# Patient Record
Sex: FEMALE | Race: WHITE
[De-identification: names, ages, dates, MRNs, and addresses within clinical notes are randomized per-mention and may not be internally consistent; named-entity substitution may affect disease eponyms.]

---

## 2019-01-15 ENCOUNTER — RECORDS - HEALTHEAST (OUTPATIENT)
Dept: ADMINISTRATIVE | Facility: OTHER | Age: 59
End: 2019-01-15

## 2019-02-04 ENCOUNTER — RECORDS - HEALTHEAST (OUTPATIENT)
Dept: ADMINISTRATIVE | Facility: OTHER | Age: 59
End: 2019-02-04

## 2019-02-12 ENCOUNTER — RECORDS - HEALTHEAST (OUTPATIENT)
Dept: ADMINISTRATIVE | Facility: OTHER | Age: 59
End: 2019-02-12

## 2019-09-25 ENCOUNTER — RECORDS - HEALTHEAST (OUTPATIENT)
Dept: ADMINISTRATIVE | Facility: OTHER | Age: 59
End: 2019-09-25

## 2020-01-09 ENCOUNTER — OFFICE VISIT - HEALTHEAST (OUTPATIENT)
Dept: INTERNAL MEDICINE | Facility: CLINIC | Age: 60
End: 2020-01-09

## 2020-01-09 ENCOUNTER — AMBULATORY - HEALTHEAST (OUTPATIENT)
Dept: INTERNAL MEDICINE | Facility: CLINIC | Age: 60
End: 2020-01-09

## 2020-01-09 ENCOUNTER — COMMUNICATION - HEALTHEAST (OUTPATIENT)
Dept: TELEHEALTH | Facility: CLINIC | Age: 60
End: 2020-01-09

## 2020-01-09 DIAGNOSIS — Z00.00 PHYSICAL EXAM, ANNUAL: ICD-10-CM

## 2020-01-09 DIAGNOSIS — K21.00 GASTROESOPHAGEAL REFLUX DISEASE WITH ESOPHAGITIS: ICD-10-CM

## 2020-01-09 DIAGNOSIS — Z86.0100 HISTORY OF COLONIC POLYPS: ICD-10-CM

## 2020-01-09 LAB
ALBUMIN SERPL-MCNC: 4.2 G/DL (ref 3.5–5)
ALP SERPL-CCNC: 68 U/L (ref 45–120)
ALT SERPL W P-5'-P-CCNC: 16 U/L (ref 0–45)
ANION GAP SERPL CALCULATED.3IONS-SCNC: 9 MMOL/L (ref 5–18)
AST SERPL W P-5'-P-CCNC: 26 U/L (ref 0–40)
BILIRUB SERPL-MCNC: 0.7 MG/DL (ref 0–1)
BUN SERPL-MCNC: 11 MG/DL (ref 8–22)
CALCIUM SERPL-MCNC: 9.6 MG/DL (ref 8.5–10.5)
CHLORIDE BLD-SCNC: 105 MMOL/L (ref 98–107)
CHOLEST SERPL-MCNC: 268 MG/DL
CO2 SERPL-SCNC: 26 MMOL/L (ref 22–31)
CREAT SERPL-MCNC: 0.81 MG/DL (ref 0.6–1.1)
ERYTHROCYTE [DISTWIDTH] IN BLOOD BY AUTOMATED COUNT: 11.3 % (ref 11–14.5)
FASTING STATUS PATIENT QL REPORTED: ABNORMAL
GFR SERPL CREATININE-BSD FRML MDRD: >60 ML/MIN/1.73M2
GLUCOSE BLD-MCNC: 99 MG/DL (ref 70–125)
HCT VFR BLD AUTO: 38.5 % (ref 35–47)
HDLC SERPL-MCNC: 91 MG/DL
HGB BLD-MCNC: 13.3 G/DL (ref 12–16)
LDLC SERPL CALC-MCNC: 163 MG/DL
MCH RBC QN AUTO: 32.2 PG (ref 27–34)
MCHC RBC AUTO-ENTMCNC: 34.6 G/DL (ref 32–36)
MCV RBC AUTO: 93 FL (ref 80–100)
PLATELET # BLD AUTO: 268 THOU/UL (ref 140–440)
PMV BLD AUTO: 7.7 FL (ref 7–10)
POTASSIUM BLD-SCNC: 4.3 MMOL/L (ref 3.5–5)
PROT SERPL-MCNC: 7.5 G/DL (ref 6–8)
RBC # BLD AUTO: 4.14 MILL/UL (ref 3.8–5.4)
SODIUM SERPL-SCNC: 140 MMOL/L (ref 136–145)
TRIGL SERPL-MCNC: 69 MG/DL
TSH SERPL DL<=0.005 MIU/L-ACNC: 1.98 UIU/ML (ref 0.3–5)
WBC: 6.2 THOU/UL (ref 4–11)

## 2020-01-09 ASSESSMENT — MIFFLIN-ST. JEOR: SCORE: 1275.55

## 2020-01-10 LAB
HPV SOURCE: NORMAL
HUMAN PAPILLOMA VIRUS 16 DNA: NEGATIVE
HUMAN PAPILLOMA VIRUS 18 DNA: NEGATIVE
HUMAN PAPILLOMA VIRUS FINAL DIAGNOSIS: NORMAL
HUMAN PAPILLOMA VIRUS OTHER HR: NEGATIVE
SPECIMEN DESCRIPTION: NORMAL

## 2020-01-15 LAB
BKR LAB AP ABNORMAL BLEEDING: NO
BKR LAB AP BIRTH CONTROL/HORMONES: NORMAL
BKR LAB AP CERVICAL APPEARANCE: NORMAL
BKR LAB AP GYN ADEQUACY: NORMAL
BKR LAB AP GYN INTERPRETATION: NORMAL
BKR LAB AP HPV REFLEX: NORMAL
BKR LAB AP LMP: NORMAL
BKR LAB AP PATIENT STATUS: NORMAL
BKR LAB AP PREVIOUS ABNORMAL: NO
BKR LAB AP PREVIOUS NORMAL: NORMAL
HIGH RISK?: NO
PATH REPORT.COMMENTS IMP SPEC: NORMAL
RESULT FLAG (HE HISTORICAL CONVERSION): NORMAL

## 2020-03-11 ENCOUNTER — OFFICE VISIT (OUTPATIENT)
Dept: OPHTHALMOLOGY | Facility: CLINIC | Age: 60
End: 2020-03-11
Attending: OPHTHALMOLOGY
Payer: COMMERCIAL

## 2020-03-11 DIAGNOSIS — H52.203 HYPEROPIA OF BOTH EYES WITH ASTIGMATISM: ICD-10-CM

## 2020-03-11 DIAGNOSIS — H04.123 BILATERAL DRY EYES: Primary | ICD-10-CM

## 2020-03-11 DIAGNOSIS — H52.03 HYPEROPIA OF BOTH EYES WITH ASTIGMATISM: ICD-10-CM

## 2020-03-11 DIAGNOSIS — H02.056 TRICHIASIS OF LEFT EYE WITHOUT ENTROPION: ICD-10-CM

## 2020-03-11 PROCEDURE — 68761 CLOSE TEAR DUCT OPENING: CPT | Mod: ZF | Performed by: OPHTHALMOLOGY

## 2020-03-11 PROCEDURE — G0463 HOSPITAL OUTPT CLINIC VISIT: HCPCS | Mod: 25

## 2020-03-11 RX ORDER — CARBOXYMETHYLCELLULOSE SODIUM 5 MG/ML
1 SOLUTION/ DROPS OPHTHALMIC PRN
COMMUNITY
End: 2021-11-06

## 2020-03-11 ASSESSMENT — CONF VISUAL FIELD
OS_NORMAL: 1
OD_NORMAL: 1
METHOD: COUNTING FINGERS

## 2020-03-11 ASSESSMENT — REFRACTION_WEARINGRX
OS_SPHERE: +0.25
OS_ADD: +2.00
OD_ADD: +2.00
OS_AXIS: 080
OS_CYLINDER: +0.25
OD_SPHERE: +0.50
OD_CYLINDER: +0.25
OD_AXIS: 100

## 2020-03-11 ASSESSMENT — TONOMETRY
OD_IOP_MMHG: 11
IOP_METHOD: ICARE
OS_IOP_MMHG: 13

## 2020-03-11 ASSESSMENT — SLIT LAMP EXAM - LIDS: COMMENTS: NORMAL

## 2020-03-11 ASSESSMENT — VISUAL ACUITY
METHOD: SNELLEN - LINEAR
CORRECTION_TYPE: GLASSES
OD_CC: 20/20
OD_SC+: -2
OS_CC: 20/20
OD_SC: 20/20
OS_SC: 20/20

## 2020-03-11 ASSESSMENT — EXTERNAL EXAM - LEFT EYE: OS_EXAM: NORMAL

## 2020-03-11 ASSESSMENT — EXTERNAL EXAM - RIGHT EYE: OD_EXAM: NORMAL

## 2020-03-11 ASSESSMENT — CUP TO DISC RATIO
OD_RATIO: 0.15
OS_RATIO: 0.15

## 2020-03-11 NOTE — PROGRESS NOTES
HPI  Shereen Encinas is a 60 year old female who is here for CEE. She reports dry eyes, gritty sensation, blurry vision as the day progresses. She is currently using ATs intermittently and Zatidor but they do not help her much. She tried Prednisolone drops previously and they did not help.    PMH: none  Ocular Hx: refractive error, dry eyes  FHx: refractive error    Assessment & Plan      (H04.123) Bilateral dry eyes  (primary encounter diagnosis)  Comment: Patient did not see benefit from tears and prednisolone  Plan: Discussed r/b/a of punctal plugs and she would like to proceed. 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 or sooner as needed.    Tessy Rangel MD  Ophthalmology Resident, PGY-2    Teaching statement:  Complete documentation of historical and exam elements from today's encounter can be found in the full encounter summary report (not reduplicated in this progress note). I personally obtained the chief complaint(s) and history of present illness.  I confirmed and edited as necessary the review of systems, past medical/surgical history, family history, social history, and examination findings as documented by others; and I examined the patient myself. I personally reviewed the relevant tests, images, and reports as documented above.     I formulated and edited as necessary the assessment and plan and discussed the findings and management plan with the patient and family.    Trina Stevens MD  Comprehensive  Ophthalmology & Ocular Pathology  Department of Ophthalmology and Visual Neurosciences  [email or web address]  Pager [phone]

## 2020-03-11 NOTE — NURSING NOTE
Chief Complaints and History of Present Illnesses   Patient presents with     New Patient     Chief Complaint(s) and History of Present Illness(es)     New Patient     Laterality: both eyes    Quality: blurred vision    Associated symptoms: foreign body sensation and itching.  Negative for flashes, floaters and photophobia    Treatments tried: artificial tears    Response to treatment: no improvement    Pain scale: 2/10              Comments     New patient presents for a comprehensive eye exam.    The patient notes that her eye have a gritty feeling.  She notes that the symptoms increase during the day.  She states that artificial tears aren't helping.  She has itchy eyes that she remembers since childhood.  Renetta Wall, COA, COA 2:38 PM 03/11/2020

## 2021-05-25 ENCOUNTER — RECORDS - HEALTHEAST (OUTPATIENT)
Dept: ADMINISTRATIVE | Facility: CLINIC | Age: 61
End: 2021-05-25

## 2021-05-26 ENCOUNTER — RECORDS - HEALTHEAST (OUTPATIENT)
Dept: ADMINISTRATIVE | Facility: CLINIC | Age: 61
End: 2021-05-26

## 2021-06-04 VITALS
HEART RATE: 62 BPM | SYSTOLIC BLOOD PRESSURE: 110 MMHG | HEIGHT: 65 IN | BODY MASS INDEX: 25.99 KG/M2 | WEIGHT: 156 LBS | DIASTOLIC BLOOD PRESSURE: 72 MMHG | OXYGEN SATURATION: 97 %

## 2021-06-05 NOTE — PROGRESS NOTES
Office Visit - Physical   Shereen Encinas   59 y.o.  female    Date of visit: 1/9/2020  Physician: Danisha López MD     Assessment and Plan   1. Gastroesophageal reflux disease with esophagitis  H/o digestive issues , especially nausea with eating . Has had multiple scopes . Only had mild GERD . No ulcers or h pylori . Feeling better now   2. History of colonic polyps  Getting 5 year scopes by [facility] . She thinks lastone is 2018     3. Physical exam, annual  Up to date on immunizations   talked about shingles   - Lipid Cascade FASTING  - Thyroid Stimulating Hormone (TSH)  - Comprehensive Metabolic Panel  - HM2(CBC w/o Differential)  - Mammo Screening Bilateral; Future      Talked about her weight concerns , BMI only minimally elevated , has large pendulous breasts but no lumps . Reassured that with her level of exercise and lack of smoking she should not have any concerns about her weight unless it steadily rises .         Time:   Return in about 1 year (around 1/9/2021).   Takes fish oil and vit d ,     Patient Profile   Social History     Social History Narrative    Youngest is 19 , 22 year old graduated and lives     Used to do banking , retired      works     Works out , dogs run one mile every day     [employer] cardio and lift         Past Medical History   No past medical history on file.    Patient Active Problem List    Diagnosis Date Noted     Gastroesophageal reflux disease with esophagitis 01/09/2020     History of colonic polyps 01/09/2020           Past Surgical History  She has no past surgical history on file.     History of Present Illness   This 59 y.o. old every time she ate felt nausea , has had endoscopy diagnosed to have GERD . Had some colon polyps     Review of Systems: A comprehensive review of systems was negative except as noted.     Medications and Allergies   Current Outpatient Medications   Medication Sig Dispense Refill     cetirizine (ZYRTEC) 10 MG tablet Take 10 mg  "by mouth daily.       omeprazole (PRILOSEC) 40 MG capsule Take 40 mg by mouth daily.       No current facility-administered medications for this visit.      No Known Allergies     Family and Social History   Family History   Problem Relation Age of Onset     Pancreatic cancer Mother      Heart attack Father      Diabetes Father      Depression Father      Gallbladder disease Sister         Social History     Tobacco Use     Smoking status: Never Smoker     Smokeless tobacco: Never Used   Substance Use Topics     Alcohol use: Yes     Frequency: 2-3 times a week     Drug use: Not on file          Physical Exam   General Appearance:       /72 (Patient Site: Right Arm, Patient Position: Sitting, Cuff Size: Adult Large)   Pulse 62   Ht 5' 4.5\" (1.638 m)   Wt 156 lb (70.8 kg)   SpO2 97%   BMI 26.36 kg/m      NECK: Neck appearance was normal. There were no neck masses and the thyroid was not enlarged.  RESPIRATORY: Breathing pattern was normal and the chest moved symmetrically.  Percussion/auscultatory percussion was normal.  Lung sounds were normal and there were no abnormal sounds.  CARDIOVASCULAR: Heart rate and rhythm were normal.  S1 and S2 were normal and there were no extra sounds or murmurs. Peripheral pulses in arms and legs were normal.  Jugular venous pressure was normal.  There was no peripheral edema.  GASTROINTESTINAL: The abdomen was normal in contour.  Bowel sounds were present.  Percussion detected no organ enlargement or tenderness.  Palpation detected no tenderness, mass, or enlarged organs.   MUSCULOSKELETAL: Skeletal configuration was normal and muscle mass was normal for age. Joint appearance was overall normal.  LYMPHATIC: There were no enlarged nodes.  SKIN/HAIR/NAILS: Skin color was normal.  There were no skin lesions.  Hair and nails were normal.  NEUROLOGIC: The patient was alert and oriented to person, place, time, and circumstance. Speech was normal. Cranial nerves were normal. Motor " strength was normal for age. The patient was normally coordinated.  PSYCHIATRIC:  Mood and affect were normal and the patient had normal recent and remote memory. The patient's judgment and insight were normal.    BREASTS: symmetrical , NO LUMPS     GENITAL/URINARY: Pelvic: cervix normal in appearance, external genitalia normal, no adnexal masses or tenderness, no cervical motion tenderness, rectovaginal septum normal, uterus normal size, shape, and consistency and vagina normal without discharge       Additional Information        Danisha López MD  Internal Medicine  Contact me at None

## 2021-06-05 NOTE — PATIENT INSTRUCTIONS - HE
Check with your insurance or pharmacy about the coverage of Shingrix vaccine for Shingles if you wish to get this.  They may want you to receive it at the pharmacy instead of in our clinic.  You would need to get 2 shots, the second one is 2-6 months after the first one. This is not a live vaccine and is much more potent than the prior vaccine .    The most common side effect is pain with mild redness and swelling around the shot site that should resolve in 2-3 days.

## 2021-06-16 PROBLEM — Z86.0100 HISTORY OF COLONIC POLYPS: Status: ACTIVE | Noted: 2020-01-09

## 2021-06-16 PROBLEM — K21.00 GASTROESOPHAGEAL REFLUX DISEASE WITH ESOPHAGITIS: Status: ACTIVE | Noted: 2020-01-09

## 2021-07-12 ENCOUNTER — TRANSFERRED RECORDS (OUTPATIENT)
Dept: HEALTH INFORMATION MANAGEMENT | Facility: CLINIC | Age: 61
End: 2021-07-12

## 2021-07-21 ENCOUNTER — TRANSFERRED RECORDS (OUTPATIENT)
Dept: HEALTH INFORMATION MANAGEMENT | Facility: CLINIC | Age: 61
End: 2021-07-21

## 2021-08-15 ENCOUNTER — HEALTH MAINTENANCE LETTER (OUTPATIENT)
Age: 61
End: 2021-08-15

## 2021-10-10 ENCOUNTER — HEALTH MAINTENANCE LETTER (OUTPATIENT)
Age: 61
End: 2021-10-10

## 2021-11-06 ENCOUNTER — OFFICE VISIT (OUTPATIENT)
Dept: FAMILY MEDICINE | Facility: CLINIC | Age: 61
End: 2021-11-06
Payer: COMMERCIAL

## 2021-11-06 VITALS
SYSTOLIC BLOOD PRESSURE: 164 MMHG | OXYGEN SATURATION: 96 % | DIASTOLIC BLOOD PRESSURE: 97 MMHG | TEMPERATURE: 98.5 F | RESPIRATION RATE: 18 BRPM | HEART RATE: 78 BPM

## 2021-11-06 DIAGNOSIS — R05.9 COUGH: ICD-10-CM

## 2021-11-06 DIAGNOSIS — J06.9 VIRAL URI WITH COUGH: Primary | ICD-10-CM

## 2021-11-06 PROCEDURE — 99214 OFFICE O/P EST MOD 30 MIN: CPT | Performed by: NURSE PRACTITIONER

## 2021-11-06 PROCEDURE — U0005 INFEC AGEN DETEC AMPLI PROBE: HCPCS | Performed by: NURSE PRACTITIONER

## 2021-11-06 PROCEDURE — U0003 INFECTIOUS AGENT DETECTION BY NUCLEIC ACID (DNA OR RNA); SEVERE ACUTE RESPIRATORY SYNDROME CORONAVIRUS 2 (SARS-COV-2) (CORONAVIRUS DISEASE [COVID-19]), AMPLIFIED PROBE TECHNIQUE, MAKING USE OF HIGH THROUGHPUT TECHNOLOGIES AS DESCRIBED BY CMS-2020-01-R: HCPCS | Performed by: NURSE PRACTITIONER

## 2021-11-06 RX ORDER — ALBUTEROL SULFATE 90 UG/1
1-2 AEROSOL, METERED RESPIRATORY (INHALATION) EVERY 4 HOURS PRN
Qty: 6.7 G | Refills: 0 | Status: SHIPPED | OUTPATIENT
Start: 2021-11-06 | End: 2023-12-14

## 2021-11-06 RX ORDER — GUAIFENESIN 600 MG/1
1200 TABLET, EXTENDED RELEASE ORAL 2 TIMES DAILY PRN
Qty: 40 TABLET | Refills: 0 | Status: SHIPPED | OUTPATIENT
Start: 2021-11-06 | End: 2023-12-14

## 2021-11-06 ASSESSMENT — ENCOUNTER SYMPTOMS
APPETITE CHANGE: 0
HEADACHES: 0
NAUSEA: 0
CHILLS: 0
DYSURIA: 0
FATIGUE: 1
COUGH: 1
WHEEZING: 0
SHORTNESS OF BREATH: 1
SORE THROAT: 0
DIARRHEA: 1
FEVER: 0
MYALGIAS: 0
VOMITING: 0
RHINORRHEA: 1
CHEST TIGHTNESS: 1
ACTIVITY CHANGE: 0

## 2021-11-07 LAB — SARS-COV-2 RNA RESP QL NAA+PROBE: NEGATIVE

## 2021-11-07 NOTE — PATIENT INSTRUCTIONS

## 2021-11-07 NOTE — PROGRESS NOTES
Patient presents with:  Respiratory Problems: CHEST FEELS PLUGGED, COUGH FOR A FEW DAYS. POSSIBLE BRONCHITIS.   Diarrhea: X2 DAYS       Clinical Decision Making: Focused exam with nasal congestion, erythremic throat, and clear lung sounds throughout. Clinical presentation consistent with viral URI with cough. Discussed symptomatic cares with mucinex and albuterol inhaler for chest congestion and cough. Discontinue phenylephrine and other OTC cold agents. COVID test pending. Results via [employer] discussed. Education provided.       ICD-10-CM    1. Viral URI with cough  J06.9 albuterol (PROAIR HFA/PROVENTIL HFA/VENTOLIN HFA) 108 (90 Base) MCG/ACT inhaler     guaiFENesin (MUCINEX) 600 MG 12 hr tablet   2. Cough  R05.9 Symptomatic COVID-19 Virus (Coronavirus) by PCR Nose       Patient Instructions       Patient Education     Viral Upper Respiratory Illness (Adult)    You have a viral upper respiratory illness (URI), which is another term for the common cold. This illness is contagious during the first few days. It is spread through the air by coughing and sneezing. It may also be spread by direct contact (touching the sick person and then touching your own eyes, nose, or mouth). Frequent handwashing will decrease risk of spread. Most viral illnesses go away within 7 to 10 days with rest and simple home remedies. Sometimes the illness may last for several weeks. Antibiotics will not kill a virus, and they are generally not prescribed for this condition.  Home care    If symptoms are severe, rest at home for the first 2 to 3 days. When you resume activity, don't let yourself get too tired.    Don't smoke. If you need help stopping, talk with your healthcare provider.    Avoid being exposed to cigarette smoke (yours or others ).    You may use acetaminophen or ibuprofen to control pain and fever, unless another medicine was prescribed. If you have chronic liver or kidney disease, have ever had a stomach ulcer or  gastrointestinal bleeding, or are taking blood-thinning medicines, talk with your healthcare provider before using these medicines. Aspirin should never be given to anyone under 18 years of age who is ill with a viral infection or fever. It may cause severe liver or brain damage.    Your appetite may be poor, so a light diet is fine. Stay well hydrated by drinking 6 to 8 glasses of fluids per day (water, soft drinks, juices, tea, or soup). Extra fluids will help loosen secretions in the nose and lungs.    Over-the-counter cold medicines will not shorten the length of time you re sick, but they may be helpful for the following symptoms: cough, sore throat, and nasal and sinus congestion. If you take prescription medicines, ask your healthcare provider or pharmacist which over-the-counter medicines are safe to use. (Note: Don't use decongestants if you have high blood pressure.)  Follow-up care  Follow up with your healthcare provider, or as advised.  When to seek medical advice  Call your healthcare provider right away if any of these occur:    Cough with lots of colored sputum (mucus)    Severe headache; face, neck, or ear pain    Difficulty swallowing due to throat pain    Fever of 100.4 F (38 C) or higher, or as directed by your healthcare provider  Call 911  Call 911 if any of these occur:    Chest pain, shortness of breath, wheezing, or difficulty breathing    Coughing up blood    Very severe pain with swallowing, especially if it goes along with a muffled voice   [employer] last reviewed this educational content on 6/1/2018 2000-2021 [employer]. All rights reserved. This information is not intended as a substitute for professional medical care. Always follow your healthcare professional's instructions.               HPI: Shereen Encinas is a 61 year old female who presents today complaining of cough with chest congestion, nasal congestion, and diarrhea for 2 days. She has taken OTC cold agents  "with decongestants and noticed \"an increased heart rate/palpatations\" overnight. Reports return from airport today following recent travel to [place]. Patient is fully COVID vaccinated. No known ill contacts or COVID exposures.     History obtained from the patient.    Problem List:  2020-01: Gastroesophageal reflux disease with esophagitis  2020-01: History of colonic polyps      No past medical history on file.    Social History     Tobacco Use     Smoking status: Never Smoker     Smokeless tobacco: Never Used   Substance Use Topics     Alcohol use: Not on file       Review of Systems   Constitutional: Positive for fatigue. Negative for activity change, appetite change, chills and fever.   HENT: Positive for congestion and rhinorrhea. Negative for ear pain and sore throat.    Respiratory: Positive for cough, chest tightness and shortness of breath. Negative for wheezing.    Gastrointestinal: Positive for diarrhea. Negative for nausea and vomiting.   Genitourinary: Negative for dysuria.   Musculoskeletal: Negative for myalgias.   Neurological: Negative for headaches.       Vitals:    11/06/21 1829 11/06/21 2005   BP: (!) 162/92 (!) 164/97   BP Location: Right arm    Patient Position: Sitting    Cuff Size: Adult Regular    Pulse: 78    Resp: 18    Temp: 98.5  F (36.9  C)    TempSrc: Oral    SpO2: 96%        Physical Exam  Constitutional:       General: She is not in acute distress.     Appearance: She is ill-appearing. She is not toxic-appearing.   HENT:      Head: Normocephalic and atraumatic.      Right Ear: Tympanic membrane, ear canal and external ear normal.      Left Ear: Tympanic membrane, ear canal and external ear normal.      Nose: Congestion and rhinorrhea present.      Mouth/Throat:      Mouth: Mucous membranes are moist.      Pharynx: Posterior oropharyngeal erythema present. No oropharyngeal exudate.   Cardiovascular:      Rate and Rhythm: Normal rate and regular rhythm.      Pulses: Normal pulses. "      Heart sounds: Normal heart sounds.   Pulmonary:      Effort: Pulmonary effort is normal.      Breath sounds: Normal breath sounds. No wheezing or rhonchi.   Musculoskeletal:      Cervical back: Neck supple.   Lymphadenopathy:      Cervical: No cervical adenopathy.   Skin:     General: Skin is warm.      Capillary Refill: Capillary refill takes less than 2 seconds.      Coloration: Skin is not pale.      Findings: No rash.   Neurological:      Mental Status: She is alert and oriented to person, place, and time.   Psychiatric:         Behavior: Behavior normal.         Labs:  No results found for any visits on 11/06/21.      At the end of the encounter, I discussed results, diagnosis, medications. Discussed red flags for immediate return to clinic/ER, as well as indications for follow up if no improvement. Patient understood and agreed to plan.     SUKHDEV Schuster CNP

## 2021-12-15 ENCOUNTER — OFFICE VISIT (OUTPATIENT)
Dept: OPHTHALMOLOGY | Facility: CLINIC | Age: 61
End: 2021-12-15
Attending: OPHTHALMOLOGY
Payer: COMMERCIAL

## 2021-12-15 DIAGNOSIS — H04.123 DRY EYES, BILATERAL: Primary | ICD-10-CM

## 2021-12-15 DIAGNOSIS — H52.4 PRESBYOPIA: ICD-10-CM

## 2021-12-15 DIAGNOSIS — H52.203 HYPEROPIC ASTIGMATISM OF BOTH EYES: ICD-10-CM

## 2021-12-15 PROCEDURE — 68761 CLOSE TEAR DUCT OPENING: CPT | Performed by: OPHTHALMOLOGY

## 2021-12-15 PROCEDURE — 92014 COMPRE OPH EXAM EST PT 1/>: CPT | Mod: 25 | Performed by: OPHTHALMOLOGY

## 2021-12-15 PROCEDURE — 92015 DETERMINE REFRACTIVE STATE: CPT

## 2021-12-15 PROCEDURE — G0463 HOSPITAL OUTPT CLINIC VISIT: HCPCS

## 2021-12-15 ASSESSMENT — EXTERNAL EXAM - RIGHT EYE: OD_EXAM: NORMAL

## 2021-12-15 ASSESSMENT — VISUAL ACUITY
OS_CC: 20/20
METHOD: SNELLEN - LINEAR
CORRECTION_TYPE: GLASSES
OD_CC: 20/20

## 2021-12-15 ASSESSMENT — REFRACTION_MANIFEST
OD_SPHERE: +0.75
OS_ADD: +2.50
OS_SPHERE: +0.25
OS_CYLINDER: +0.25
OS_AXIS: 023
OD_CYLINDER: SPHERE
OD_ADD: +2.50

## 2021-12-15 ASSESSMENT — REFRACTION_WEARINGRX
OS_ADD: +2.00
OS_CYLINDER: +0.25
OS_SPHERE: +0.25
OD_SPHERE: +0.50
OD_AXIS: 100
OS_AXIS: 080
OD_CYLINDER: +0.25
OD_ADD: +2.00

## 2021-12-15 ASSESSMENT — CONF VISUAL FIELD
METHOD: COUNTING FINGERS
OS_NORMAL: 1
OD_NORMAL: 1

## 2021-12-15 ASSESSMENT — CUP TO DISC RATIO
OD_RATIO: 0.15
OS_RATIO: 0.15

## 2021-12-15 ASSESSMENT — TONOMETRY
IOP_METHOD: TONOPEN
OS_IOP_MMHG: 15
OD_IOP_MMHG: 15

## 2021-12-15 ASSESSMENT — EXTERNAL EXAM - LEFT EYE: OS_EXAM: NORMAL

## 2021-12-15 NOTE — NURSING NOTE
Chief Complaints and History of Present Illnesses   Patient presents with     Annual Eye Exam     Chief Complaint(s) and History of Present Illness(es)     Annual Eye Exam     Laterality: both eyes    Onset: 1 year ago              Comments     Pt. States that she is doing well. VA seems stable but may  have worsened slightly. Plug fell out of RE and seems like it is falling out LE. Dryness has improved with plugs but has been worsening since plug fell out.   Trish Wil COT 1:53 PM December 15, 2021

## 2021-12-15 NOTE — PROGRESS NOTES
HPI     Annual Eye Exam     In both eyes.  This started 1 year ago.              Comments     Pt. States that she is doing well. VA seems stable but may  have worsened slightly. Plug fell out of RE and seems like it is falling out LE. Dryness has improved with plugs but has been worsening since plug fell out.   Trish De La Torre COT 1:53 PM December 15, 2021             Last edited by Trish De La Torre on 12/15/2021  1:53 PM. (History)          HPI  Shereen Encinas is a 61 year old female who is here for follow-up of dry eyes and full eye exam. She thinks the plug fell out of her right eye, and may be falling out of her left eye. The dryness is improved when the plugs radha in place. Her vision is overall stable to slightly more blurred.     PMH: Dry eye  Ocular Hx: refractive error, dry eyes  FHx: refractive error    Assessment & Plan      (H04.123) Bilateral dry eyes  (primary encounter diagnosis)  Comment: Patient did not see benefit from tears and prednisolone. She did notice improvement with plugs, but RLL plug fell out.   Plan: RLL plug replaced (1.0 mm). LLL plug (1.0) remains in place. Discussed that if plug repeatedly falls out, may consider cautery.  Continue PF Refresh, can try celluvisc  If additional therapy needed in the future, can add Restasis/Xiidra    (H52.03,  H52.203) Hyperopia of both eyes with astigmatism  Comment: Good vision with current glasses, minimal change in refraction  Plan: Given updated glasses Rx.   -----------------------------------------------------------------------------------    Patient disposition:   Return in about 1 year (around 12/15/2022) for dilated eye exam, or sooner as needed.     Teaching statement:  Complete documentation of historical and exam elements from today's encounter can be found in the full encounter summary report (not reduplicated in this progress note). I personally obtained the chief complaint(s) and history of present illness.  I confirmed and edited as  necessary the review of systems, past medical/surgical history, family history, social history, and examination findings as documented by others; and I examined the patient myself. I personally reviewed the relevant tests, images, and reports as documented above.     I formulated and edited as necessary the assessment and plan and discussed the findings and management plan with the patient and family.    Trina Stevens MD  Comprehensive Ophthalmology & Ocular Pathology  Department of Ophthalmology and Visual Neurosciences  [email or web address]  Pager [phone]

## 2022-05-23 ENCOUNTER — TRANSFERRED RECORDS (OUTPATIENT)
Dept: HEALTH INFORMATION MANAGEMENT | Facility: CLINIC | Age: 62
End: 2022-05-23
Payer: COMMERCIAL

## 2022-08-03 ENCOUNTER — TRANSFERRED RECORDS (OUTPATIENT)
Dept: HEALTH INFORMATION MANAGEMENT | Facility: CLINIC | Age: 62
End: 2022-08-03

## 2022-09-18 ENCOUNTER — HEALTH MAINTENANCE LETTER (OUTPATIENT)
Age: 62
End: 2022-09-18

## 2022-09-27 ENCOUNTER — TRANSFERRED RECORDS (OUTPATIENT)
Dept: HEALTH INFORMATION MANAGEMENT | Facility: CLINIC | Age: 62
End: 2022-09-27

## 2023-05-18 ENCOUNTER — TRANSFERRED RECORDS (OUTPATIENT)
Dept: HEALTH INFORMATION MANAGEMENT | Facility: CLINIC | Age: 63
End: 2023-05-18
Payer: COMMERCIAL

## 2023-06-09 ENCOUNTER — TRANSFERRED RECORDS (OUTPATIENT)
Dept: HEALTH INFORMATION MANAGEMENT | Facility: CLINIC | Age: 63
End: 2023-06-09
Payer: COMMERCIAL

## 2023-07-06 ENCOUNTER — TRANSFERRED RECORDS (OUTPATIENT)
Dept: HEALTH INFORMATION MANAGEMENT | Facility: CLINIC | Age: 63
End: 2023-07-06
Payer: COMMERCIAL

## 2023-08-17 ENCOUNTER — TRANSFERRED RECORDS (OUTPATIENT)
Dept: HEALTH INFORMATION MANAGEMENT | Facility: CLINIC | Age: 63
End: 2023-08-17
Payer: COMMERCIAL

## 2023-09-25 ENCOUNTER — TRANSFERRED RECORDS (OUTPATIENT)
Dept: HEALTH INFORMATION MANAGEMENT | Facility: CLINIC | Age: 63
End: 2023-09-25
Payer: COMMERCIAL

## 2023-10-04 ENCOUNTER — TRANSFERRED RECORDS (OUTPATIENT)
Dept: HEALTH INFORMATION MANAGEMENT | Facility: CLINIC | Age: 63
End: 2023-10-04
Payer: COMMERCIAL

## 2023-10-08 ENCOUNTER — HEALTH MAINTENANCE LETTER (OUTPATIENT)
Age: 63
End: 2023-10-08

## 2023-10-16 ENCOUNTER — TRANSFERRED RECORDS (OUTPATIENT)
Dept: HEALTH INFORMATION MANAGEMENT | Facility: CLINIC | Age: 63
End: 2023-10-16
Payer: COMMERCIAL

## 2023-10-18 ENCOUNTER — TELEPHONE (OUTPATIENT)
Dept: INTERNAL MEDICINE | Facility: CLINIC | Age: 63
End: 2023-10-18
Payer: COMMERCIAL

## 2023-10-18 NOTE — TELEPHONE ENCOUNTER
October 18, 2023    Outside records received from [facility].  It looks like the patient hasn't been seen since 2020, but records were placed in the inbox for Dr. López to review.  A copy was sent to HIM to be scanned into the patient's chart.    Kerry Fabian

## 2023-10-19 DIAGNOSIS — J32.9 SINUSITIS, UNSPECIFIED CHRONICITY, UNSPECIFIED LOCATION: Primary | ICD-10-CM

## 2023-10-19 RX ORDER — AZITHROMYCIN 250 MG/1
TABLET, FILM COATED ORAL
Qty: 6 TABLET | Refills: 0 | Status: SHIPPED | OUTPATIENT
Start: 2023-10-19 | End: 2023-10-24

## 2023-12-11 ENCOUNTER — TRANSFERRED RECORDS (OUTPATIENT)
Dept: HEALTH INFORMATION MANAGEMENT | Facility: CLINIC | Age: 63
End: 2023-12-11
Payer: COMMERCIAL

## 2023-12-13 ENCOUNTER — TRANSFERRED RECORDS (OUTPATIENT)
Dept: HEALTH INFORMATION MANAGEMENT | Facility: CLINIC | Age: 63
End: 2023-12-13
Payer: COMMERCIAL

## 2023-12-13 NOTE — PROGRESS NOTES
[facility]  [address] [place] [address], [address]  [facility] [address]  Phone: [phone]  Primary Provider: Kyle Ahumada  Pre-op Performing Provider: HARMEET DENT      PREOPERATIVE EVALUATION:  Today's date: 12/14/2023    Shereen is a 63 year old, presenting for the following:  Pre-Op Exam    Surgical Information:  Surgery/Procedure: Thumb proximal phalanx open reduction internal fixation  Surgery Location: [facility]  Surgeon: Hiren Das  Surgery Date: 12/18/2023  Time of Surgery: 1:00 PM  Where patient plans to recover: At home with family  Fax number for surgical facility: [phone]    Assessment & Plan     The proposed surgical procedure is considered LOW risk.    Preoperative examination  Medically optimized for the procedure.    Gastroesophageal reflux disease with esophagitis without hemorrhage  Stable. Continue medication.    Closed avulsion fracture of right thumb with malunion, subsequent encounter    IgA deficiency (H)  Stable.     - No identified additional risk factors other than previously addressed    Antiplatelet or Anticoagulation Medication Instructions:   - Patient is on no antiplatelet or anticoagulation medications.    Additional Medication Instructions:  Hold colestipol the night before the procedure.   Avoid aspirin 7 days before the surgery. Avoid nonsteroidal anti-inflammatory pain medication like ibuprofen, Motrin, or Aleve 7 days before the surgery.  Tylenol can be used for pain.  Avoid any over the counter multivitamins or herbal supplement 7 days before surgery   You can resume these medications after surgery      RECOMMENDATION:  APPROVAL GIVEN to proceed with proposed procedure, without further diagnostic evaluation.      Subjective       HPI related to upcoming procedure:   Patient denies chest pain, shortness of breath, palpitations, headache, lightheadedness, syncope, fever or chills. Patient is able to climb 1  "flight of stairs without feeling short of breath or having chest pain.  Patient denies personal or family history of complications with anesthesia. Patient does not have a history of heart failure or TIA/stroke. Patient does not smoke and drinks alcohol occasionally.      Health Care Directive:  Patient does not have a Health Care Directive or Living Will:     Preoperative Review of :   reviewed - no record of controlled substances prescribed.      Review of Systems   Constitutional:  Negative for chills, fatigue and fever.   Respiratory:  Negative for chest tightness and shortness of breath.    Cardiovascular:  Negative for chest pain and palpitations.   Neurological:  Negative for dizziness, syncope and light-headedness.         Patient Active Problem List    Diagnosis Date Noted    Gastroesophageal reflux disease with esophagitis 01/09/2020     Priority: Medium    History of colonic polyps 01/09/2020     Priority: Medium      No past medical history on file.  No past surgical history on file.  Current Outpatient Medications   Medication Sig Dispense Refill    colestipol (COLESTID) 1 g tablet Take 1 g by mouth daily      omeprazole (PRILOSEC) 20 MG DR capsule Take 20 mg by mouth daily         No Known Allergies     Social History     Tobacco Use    Smoking status: Never    Smokeless tobacco: Never   Substance Use Topics    Alcohol use: Not on file     History   Drug Use Not on file         Objective     /82 (BP Location: Left arm, Patient Position: Sitting, Cuff Size: Adult Regular)   Pulse 74   Temp 97.7  F (36.5  C) (Oral)   Resp 15   Ht 1.638 m (5' 4.5\")   Wt 69 kg (152 lb 1.6 oz)   SpO2 98%   BMI 25.70 kg/m      Physical Exam  Vitals reviewed.   Constitutional:       Appearance: Normal appearance.   HENT:      Mouth/Throat:      Mouth: Mucous membranes are moist.      Pharynx: Oropharynx is clear. No oropharyngeal exudate or posterior oropharyngeal erythema.   Cardiovascular:      Rate and " "Rhythm: Normal rate and regular rhythm.      Heart sounds: Normal heart sounds. No murmur heard.     No gallop.   Pulmonary:      Effort: Pulmonary effort is normal. No respiratory distress.      Breath sounds: Normal breath sounds. No stridor. No wheezing, rhonchi or rales.   Abdominal:      General: Abdomen is flat. There is no distension.      Palpations: Abdomen is soft. There is no mass.      Tenderness: There is no abdominal tenderness. There is no guarding.      Hernia: No hernia is present.   Musculoskeletal:         General: Normal range of motion.      Right lower leg: No edema.      Left lower leg: No edema.   Skin:     General: Skin is warm and dry.   Neurological:      Mental Status: She is alert.         No results for input(s): \"HGB\", \"PLT\", \"INR\", \"NA\", \"POTASSIUM\", \"CR\", \"A1C\" in the last 75876 hours.     Diagnostics:  No labs were ordered during this visit.   No EKG required, no history of coronary heart disease, significant arrhythmia, peripheral arterial disease or other structural heart disease.    Revised Cardiac Risk Index (RCRI):  The patient has the following serious cardiovascular risks for perioperative complications:   - No serious cardiac risks = 0 points     RCRI Interpretation: 0 points: Class I (very low risk - 0.4% complication rate)         Signed Electronically by: Bhakti Bryant MD  Copy of this evaluation report is provided to requesting physician.      "

## 2023-12-14 ENCOUNTER — OFFICE VISIT (OUTPATIENT)
Dept: FAMILY MEDICINE | Facility: CLINIC | Age: 63
End: 2023-12-14
Payer: COMMERCIAL

## 2023-12-14 VITALS
SYSTOLIC BLOOD PRESSURE: 122 MMHG | BODY MASS INDEX: 25.34 KG/M2 | HEIGHT: 65 IN | HEART RATE: 74 BPM | TEMPERATURE: 97.7 F | OXYGEN SATURATION: 98 % | WEIGHT: 152.1 LBS | DIASTOLIC BLOOD PRESSURE: 82 MMHG | RESPIRATION RATE: 15 BRPM

## 2023-12-14 DIAGNOSIS — K21.00 GASTROESOPHAGEAL REFLUX DISEASE WITH ESOPHAGITIS WITHOUT HEMORRHAGE: ICD-10-CM

## 2023-12-14 DIAGNOSIS — D80.2 IGA DEFICIENCY (H): ICD-10-CM

## 2023-12-14 DIAGNOSIS — S62.501P: ICD-10-CM

## 2023-12-14 DIAGNOSIS — Z01.818 PREOPERATIVE EXAMINATION: Primary | ICD-10-CM

## 2023-12-14 PROCEDURE — 99214 OFFICE O/P EST MOD 30 MIN: CPT | Performed by: INTERNAL MEDICINE

## 2023-12-14 RX ORDER — MONTELUKAST SODIUM 4 MG/1
1 TABLET, CHEWABLE ORAL DAILY
COMMUNITY

## 2023-12-14 ASSESSMENT — ENCOUNTER SYMPTOMS
LIGHT-HEADEDNESS: 0
CHILLS: 0
SHORTNESS OF BREATH: 0
FATIGUE: 0
DIZZINESS: 0
CHEST TIGHTNESS: 0
FEVER: 0
PALPITATIONS: 0

## 2023-12-14 ASSESSMENT — PAIN SCALES - GENERAL: PAINLEVEL: MODERATE PAIN (5)

## 2023-12-14 NOTE — PATIENT INSTRUCTIONS
Avoid aspirin 7 days before the surgery. Avoid nonsteroidal anti-inflammatory pain medication like ibuprofen, Motrin, or Aleve 7 days before the surgery.  Tylenol can be used for pain.  Avoid any over the counter multivitamins or herbal supplement 7 days before surgery   You can resume these medications after surgery    Hold colestipol the night before the procedure.

## 2023-12-17 ENCOUNTER — HEALTH MAINTENANCE LETTER (OUTPATIENT)
Age: 63
End: 2023-12-17

## 2023-12-18 ENCOUNTER — TRANSFERRED RECORDS (OUTPATIENT)
Dept: HEALTH INFORMATION MANAGEMENT | Facility: CLINIC | Age: 63
End: 2023-12-18
Payer: COMMERCIAL

## 2023-12-27 ENCOUNTER — TRANSFERRED RECORDS (OUTPATIENT)
Dept: HEALTH INFORMATION MANAGEMENT | Facility: CLINIC | Age: 63
End: 2023-12-27
Payer: COMMERCIAL

## 2024-01-05 ENCOUNTER — TRANSFERRED RECORDS (OUTPATIENT)
Dept: HEALTH INFORMATION MANAGEMENT | Facility: CLINIC | Age: 64
End: 2024-01-05
Payer: COMMERCIAL

## 2024-01-24 ENCOUNTER — TRANSFERRED RECORDS (OUTPATIENT)
Dept: HEALTH INFORMATION MANAGEMENT | Facility: CLINIC | Age: 64
End: 2024-01-24
Payer: COMMERCIAL

## 2024-02-22 ENCOUNTER — TRANSFERRED RECORDS (OUTPATIENT)
Dept: HEALTH INFORMATION MANAGEMENT | Facility: CLINIC | Age: 64
End: 2024-02-22
Payer: COMMERCIAL

## 2024-03-28 ENCOUNTER — TRANSFERRED RECORDS (OUTPATIENT)
Dept: HEALTH INFORMATION MANAGEMENT | Facility: CLINIC | Age: 64
End: 2024-03-28
Payer: COMMERCIAL

## 2024-04-01 PROBLEM — S62.501P: Status: ACTIVE | Noted: 2023-12-14

## 2024-10-22 ENCOUNTER — TRANSFERRED RECORDS (OUTPATIENT)
Dept: HEALTH INFORMATION MANAGEMENT | Facility: CLINIC | Age: 64
End: 2024-10-22
Payer: COMMERCIAL

## 2025-01-12 ENCOUNTER — HEALTH MAINTENANCE LETTER (OUTPATIENT)
Age: 65
End: 2025-01-12

## 2025-02-28 ENCOUNTER — ANCILLARY PROCEDURE (OUTPATIENT)
Dept: GENERAL RADIOLOGY | Facility: CLINIC | Age: 65
End: 2025-02-28
Payer: COMMERCIAL

## 2025-02-28 DIAGNOSIS — J06.9 URI WITH COUGH AND CONGESTION: ICD-10-CM

## 2025-02-28 DIAGNOSIS — R05.2 SUBACUTE COUGH: ICD-10-CM

## 2025-02-28 PROCEDURE — 71046 X-RAY EXAM CHEST 2 VIEWS: CPT | Mod: TC | Performed by: RADIOLOGY

## 2025-03-04 ENCOUNTER — TELEPHONE (OUTPATIENT)
Dept: FAMILY MEDICINE | Facility: CLINIC | Age: 65
End: 2025-03-04

## 2025-03-04 ENCOUNTER — OFFICE VISIT (OUTPATIENT)
Dept: FAMILY MEDICINE | Facility: CLINIC | Age: 65
End: 2025-03-04
Payer: COMMERCIAL

## 2025-03-04 VITALS
WEIGHT: 151.9 LBS | SYSTOLIC BLOOD PRESSURE: 144 MMHG | TEMPERATURE: 97.9 F | OXYGEN SATURATION: 95 % | BODY MASS INDEX: 25.67 KG/M2 | DIASTOLIC BLOOD PRESSURE: 70 MMHG | HEART RATE: 73 BPM | RESPIRATION RATE: 16 BRPM

## 2025-03-04 DIAGNOSIS — R09.89 CHEST CONGESTION: ICD-10-CM

## 2025-03-04 DIAGNOSIS — R09.89 RHONCHI AT BOTH LUNG BASES: Primary | ICD-10-CM

## 2025-03-04 PROCEDURE — 3078F DIAST BP <80 MM HG: CPT

## 2025-03-04 PROCEDURE — 3077F SYST BP >= 140 MM HG: CPT

## 2025-03-04 PROCEDURE — 99214 OFFICE O/P EST MOD 30 MIN: CPT

## 2025-03-04 RX ORDER — ACETAMINOPHEN AND CODEINE PHOSPHATE 300; 30 MG/1; MG/1
TABLET ORAL PRN
COMMUNITY
End: 2025-03-04

## 2025-03-04 RX ORDER — FLUTICASONE FUROATE 50 UG/1
1 POWDER RESPIRATORY (INHALATION) DAILY
Qty: 30 EACH | Refills: 0 | Status: SHIPPED | OUTPATIENT
Start: 2025-03-04 | End: 2025-04-03

## 2025-03-04 ASSESSMENT — ENCOUNTER SYMPTOMS: COUGH: 1

## 2025-03-04 NOTE — PROGRESS NOTES
"  Assessment & Plan     Rhonchi at both lung bases  Chest congestion    Ongoing 4-week history of chest congestion  Started with a cough.  He was started on a Z-Amaury which helped some but not complete resolution  Has felt rundown  Was seen last week and chest x-ray was negative  Last night she was having struggle to breathe and get it deep breath  She felt like her throat was closing in  Now complains of some sore throat  Has been able to eat minimally but able to tolerate liquids  On physical exam the patient had rhonchi of both lung bases  I suspect that the patient started with a viral upper respiratory infection that has not progressed to a bacterial although she does not have a fever  With the sensation of the throat closing as well as tightness in the chest I am going to start the patient on ICS inhaler to be used once a day.  Discussed swish and spit to prevent oral thrush  We also do not start the patient on Augmentin for the sore throat versus the chest congestion  If she has the same sensation of tightness and difficulty to breathe she should report to the emergency department      - fluticasone (ARNUITY ELLIPTA) 50 MCG/ACT inhaler; Inhale 1 puff into the lungs daily.  - amoxicillin-clavulanate (AUGMENTIN) 875-125 MG tablet; Take 1 tablet by mouth 2 times daily for 10 days.          BMI  Estimated body mass index is 25.67 kg/m  as calculated from the following:    Height as of 2/28/25: 1.638 m (5' 4.5\").    Weight as of this encounter: 68.9 kg (151 lb 14.4 oz).       Rafat Regan is a 65 year old, presenting for the following health issues:  Cough (Had a cold beginning of February, was given a Zpak but did not help. Terrible mucous and cough stayed, has been getting worse. Swelling on both sides of throat, gets to the point where pt can't swallow or take a deep breath. )      3/4/2025     7:53 AM   Additional Questions   Roomed by Margarette JAIN     History of Present Illness       Reason for visit:  " Cough, chest tightness & tons of mucus  Symptom onset:  3-4 weeks ago  Symptoms include:  Cough & earache &mucus in throat , runny nose  Symptom intensity:  Severe  Symptom progression:  Worsening  Had these symptoms before:  Yes  Has tried/received treatment for these symptoms:  Yes  Previous treatment was successful:  Yes  Prior treatment description:  [facility]  What makes it better:  Vicks on throat every night   She is taking medications regularly.        February 8th went to [place] to go [employer] on Monday and by the end of the week was coughing so bad she couldn't keep up with Maana. Was prescribed a z pack. This helped some but did not go away completely  Last week she came back and was still having problems with productive cough  Still feeling raspy and tight  Has gotten worse and worse over the weekend  Yesterday thought she was in the clear however Ears were ringing and throat was tight. Went to bed and at midnight felt like she couldn't breath  Used hot tea and that loosened some things up. Feels like can't swallow  Felt like her throat was closing in  No new medicines or foods  Has not been able to swallow whole foods    Feels like has a hard time breath - sometimes has to take a deep breath to feel enough are  Last week was getting production of yellowish chucks  Coughing to get things up  Tessalon perles did not help  Unsure of fever at all  No vomitting  Always has loose stools          Objective    BP (!) 144/70   Pulse 73   Temp 97.9  F (36.6  C) (Oral)   Resp 16   Wt 68.9 kg (151 lb 14.4 oz)   SpO2 95%   BMI 25.67 kg/m    Body mass index is 25.67 kg/m .    Physical Exam  Vitals reviewed.   Constitutional:       Appearance: Normal appearance.   HENT:      Head: Normocephalic and atraumatic.      Right Ear: Tympanic membrane, ear canal and external ear normal.      Left Ear: Tympanic membrane, ear canal and external ear normal.      Nose: Nose normal.      Mouth/Throat:      Mouth:  Mucous membranes are moist.   Eyes:      Extraocular Movements: Extraocular movements intact.      Pupils: Pupils are equal, round, and reactive to light.   Cardiovascular:      Rate and Rhythm: Normal rate and regular rhythm.      Heart sounds: Normal heart sounds.   Pulmonary:      Effort: Pulmonary effort is normal.      Breath sounds: Rhonchi present.   Abdominal:      General: Abdomen is flat. Bowel sounds are normal.      Palpations: Abdomen is soft.   Musculoskeletal:      Cervical back: Normal range of motion and neck supple.      Comments: Appropriate strength in tested fields   Skin:     General: Skin is warm and dry.   Neurological:      General: No focal deficit present.      Mental Status: She is alert.   Psychiatric:         Mood and Affect: Mood normal.         Behavior: Behavior normal.              Signed Electronically by: Jose Mendoza DO

## 2025-03-04 NOTE — TELEPHONE ENCOUNTER
"  Medication Question     Contacts       Contact Date/Time Type Contact Phone/Fax    03/04/2025 12:16 PM CST Phone (Incoming) Shereen Encinas (Self) [phone] (H)        Patient calling to report she called about 20 pharmacies for the following new medication; response is \"out of stock for 50 MCG/ACT.\"    Patient found only 100 MCG at [facility] on [address] in [place].  \"Will this work?\"  \"I need something.\"    Patient asking if provider can order the 100 MCG and send a prescription for the 100 MCG to [facility] in [place] as below    Patient is leaving [facility] in the morning. Need to pick it up today.    Please Advise and call patient when sent to the pharmacy.    What medication are you calling about (include dose and sig)?:     fluticasone (ARNUITY ELLIPTA) 50 MCG/ACT inhaler 30 each 0 3/4/2025 4/3/2025 No  Sig - Route: Inhale 1 puff into the lungs daily. - Inhalation  Sent to pharmacy as: Arnuity Ellipta 50 MCG/ACT Inhalation Aerosol Powder Breath Activated (fluticasone)      Preferred Pharmacy:       [id number] [address] AT [place] [address] [address] [place] [address]  Phone: [phone] Fax: [phone]      Controlled Substance Agreement on file:   CSA -- Patient Level:    CSA: None found at the patient level.       Who prescribed the medication?: Jose Mendoza        Could we send this information to you in [employer] or would you prefer to receive a phone call?:   Patient would prefer a phone call   Okay to leave a detailed message?: Yes at Cell number on file:    Telephone Information:   Mobile [phone]     "

## 2025-03-04 NOTE — PATIENT INSTRUCTIONS
Thank you for seeing us today at [facility].    Very sorry to see Shereen MEJÍA Ce not feeling well    An inhaler and antibiotic were sent to your pharmacy.  Please remember to swish and spit following use of inhaler  I do not foresee complications of going on your upcoming trip however use your best judgment  If worsening chest pain, tightness, shortness of breath please report to the emergency department    Sincerely,  Jose Mendoza DO, MS  [facility]  [phone]

## 2025-05-12 ENCOUNTER — ANCILLARY PROCEDURE (OUTPATIENT)
Dept: GENERAL RADIOLOGY | Facility: CLINIC | Age: 65
End: 2025-05-12
Attending: PHYSICIAN ASSISTANT
Payer: COMMERCIAL

## 2025-05-12 ENCOUNTER — OFFICE VISIT (OUTPATIENT)
Dept: INTERNAL MEDICINE | Facility: CLINIC | Age: 65
End: 2025-05-12
Payer: COMMERCIAL

## 2025-05-12 VITALS
SYSTOLIC BLOOD PRESSURE: 150 MMHG | BODY MASS INDEX: 25.58 KG/M2 | OXYGEN SATURATION: 97 % | HEIGHT: 65 IN | HEART RATE: 77 BPM | DIASTOLIC BLOOD PRESSURE: 82 MMHG | TEMPERATURE: 97 F | WEIGHT: 153.5 LBS

## 2025-05-12 DIAGNOSIS — R05.1 ACUTE COUGH: Primary | ICD-10-CM

## 2025-05-12 DIAGNOSIS — R05.9 COUGH, UNSPECIFIED TYPE: ICD-10-CM

## 2025-05-12 DIAGNOSIS — R06.02 SOB (SHORTNESS OF BREATH): ICD-10-CM

## 2025-05-12 DIAGNOSIS — R05.1 ACUTE COUGH: ICD-10-CM

## 2025-05-12 DIAGNOSIS — R09.89 CHEST CONGESTION: ICD-10-CM

## 2025-05-12 PROCEDURE — 71046 X-RAY EXAM CHEST 2 VIEWS: CPT | Mod: TC | Performed by: STUDENT IN AN ORGANIZED HEALTH CARE EDUCATION/TRAINING PROGRAM

## 2025-05-12 PROCEDURE — 3079F DIAST BP 80-89 MM HG: CPT | Performed by: PHYSICIAN ASSISTANT

## 2025-05-12 PROCEDURE — 3077F SYST BP >= 140 MM HG: CPT | Performed by: PHYSICIAN ASSISTANT

## 2025-05-12 PROCEDURE — 99214 OFFICE O/P EST MOD 30 MIN: CPT | Performed by: PHYSICIAN ASSISTANT

## 2025-05-12 RX ORDER — PREDNISONE 20 MG/1
TABLET ORAL
Qty: 20 TABLET | Refills: 0 | Status: SHIPPED | OUTPATIENT
Start: 2025-05-12

## 2025-05-12 ASSESSMENT — ENCOUNTER SYMPTOMS: COUGH: 1

## 2025-05-12 NOTE — PROGRESS NOTES
"  Assessment & Plan     Acute cough    - XR Chest 2 Views; Future  - predniSONE (DELTASONE) 20 MG tablet; Take 3 tabs by mouth daily x 3 days, then 2 tabs daily x 3 days, then 1 tab daily x 3 days, then 1/2 tab daily x 3 days.    Chest congestion    - predniSONE (DELTASONE) 20 MG tablet; Take 3 tabs by mouth daily x 3 days, then 2 tabs daily x 3 days, then 1 tab daily x 3 days, then 1/2 tab daily x 3 days.  - General PFT Lab (Please always keep checked); Future  - Pulmonary Function Test; Future  - fluticasone (ARNUITY ELLIPTA) 100 MCG/ACT inhaler; Inhale 1 puff into the lungs daily.    Cough, unspecified type    - General PFT Lab (Please always keep checked); Future    SOB (shortness of breath)    - General PFT Lab (Please always keep checked); Future    Rhonchi at both lung bases    - fluticasone (ARNUITY ELLIPTA) 100 MCG/ACT inhaler; Inhale 1 puff into the lungs daily.    Notified of results via my chart  Medications as above  PFT order  Follow up with primary after to review       BMI  Estimated body mass index is 25.94 kg/m  as calculated from the following:    Height as of this encounter: 1.638 m (5' 4.5\").    Weight as of this encounter: 69.6 kg (153 lb 8 oz).             Rafat Regan is a 65 year old, presenting for the following health issues:  Cough    Cough    History of Present Illness       Reason for visit:  Recurring congestion, hard time breathing & cough    She eats 2-3 servings of fruits and vegetables daily.She consumes 1 sweetened beverage(s) daily.She exercises with enough effort to increase her heart rate 60 or more minutes per day.  She exercises with enough effort to increase her heart rate 5 days per week.   She is taking medications regularly.        RESPIRATORY SYMPTOMS    Duration: 2 weeks  Trip to [facility]  Inhaler did help with congestion initially  Feeling of sore throat and chest pain with coughing  Description  Cough only now.   Coughing a lot felt very congestion.   Last " "week felt that cough was more loose.     Severity: moderate  Accompanying signs and symptoms: None  History (predisposing factors):  hx of prolonged coughing and treatment with antibiotics x2 this winter   Precipitating or alleviating factors:   Therapies tried and outcome:  rest and fluids guaifenesin                     Objective    BP (!) 150/82   Pulse 77   Temp 97  F (36.1  C) (Temporal)   Ht 1.638 m (5' 4.5\")   Wt 69.6 kg (153 lb 8 oz)   SpO2 97%   BMI 25.94 kg/m    Body mass index is 25.94 kg/m .  Physical Exam   GENERAL: alert and no distress  HENT: normal cephalic/atraumatic, ear canals and TM's normal, nose and mouth without ulcers or lesions, oropharynx clear, and oral mucous membranes moist  NECK: no adenopathy, no asymmetry, masses, or scars  RESP: lungs clear to auscultation - no rales, rhonchi or wheezes  CV: regular rates and rhythm and normal S1 S2, no S3 or S4  MS: no gross musculoskeletal defects noted, no edema    XR Chest 2 Views  Result Date: 5/13/2025  EXAM: XR CHEST 2 VIEWS LOCATION: [facility] DATE: 5/12/2025 INDICATION:  Acute cough COMPARISON: Chest radiograph 2/28/2025     IMPRESSION: No focal consolidation, pleural effusion or pneumothorax. Cardiomediastinal silhouette is unremarkable.        Signed Electronically by: Marleyn Roy PA-C    "

## 2025-05-13 ENCOUNTER — RESULTS FOLLOW-UP (OUTPATIENT)
Dept: INTERNAL MEDICINE | Facility: CLINIC | Age: 65
End: 2025-05-13

## 2025-05-23 RX ORDER — ALBUTEROL SULFATE 0.83 MG/ML
2.5 SOLUTION RESPIRATORY (INHALATION) ONCE
Status: COMPLETED | OUTPATIENT
Start: 2025-05-27 | End: 2025-05-27

## 2025-05-27 ENCOUNTER — HOSPITAL ENCOUNTER (OUTPATIENT)
Dept: RESPIRATORY THERAPY | Facility: CLINIC | Age: 65
Discharge: HOME | End: 2025-05-27
Attending: PHYSICIAN ASSISTANT
Payer: COMMERCIAL

## 2025-05-27 DIAGNOSIS — R05.9 COUGH, UNSPECIFIED TYPE: ICD-10-CM

## 2025-05-27 DIAGNOSIS — R06.02 SOB (SHORTNESS OF BREATH): ICD-10-CM

## 2025-05-27 DIAGNOSIS — R09.89 CHEST CONGESTION: Primary | ICD-10-CM

## 2025-05-27 LAB
HGB BLD-MCNC: 12.9 G/DL (ref 11.7–15.7)
MCV RBC AUTO: 96 FL (ref 78–100)

## 2025-05-27 PROCEDURE — 94726 PLETHYSMOGRAPHY LUNG VOLUMES: CPT

## 2025-05-27 PROCEDURE — 999N000157 HC STATISTIC RCP TIME EA 10 MIN

## 2025-05-27 PROCEDURE — 94060 EVALUATION OF WHEEZING: CPT

## 2025-05-27 PROCEDURE — 36415 COLL VENOUS BLD VENIPUNCTURE: CPT | Performed by: PHYSICIAN ASSISTANT

## 2025-05-27 PROCEDURE — 85018 HEMOGLOBIN: CPT | Performed by: PHYSICIAN ASSISTANT

## 2025-05-27 PROCEDURE — 250N000009 HC RX 250: Performed by: PHYSICIAN ASSISTANT

## 2025-05-27 PROCEDURE — 94729 DIFFUSING CAPACITY: CPT

## 2025-05-27 RX ADMIN — ALBUTEROL SULFATE 2.5 MG: 2.5 SOLUTION RESPIRATORY (INHALATION) at 07:26

## 2025-05-27 NOTE — PROGRESS NOTES
RESPIRATORY CARE NOTE     Patient Name: Shereen Encinas  Today's Date: 5/27/2025     Complete PFT done. Pt performed tests with good effort. Alb neb given. Test results meet ATS criteria. Results scanned into epic. Pt left in no distress.       Sherry Mckeon, RT

## 2025-05-29 LAB
DLCOCOR-%PRED-PRE: 106 %
DLCOCOR-PRE: 21.37 ML/MIN/MMHG
DLCOUNC-%PRED-PRE: 105 %
DLCOUNC-PRE: 21.03 ML/MIN/MMHG
DLCOUNC-PRED: 19.98 ML/MIN/MMHG
ERV-%PRED-PRE: 19 %
ERV-PRE: 0.21 L
ERV-PRED: 1.1 L
EXPTIME-PRE: 4.33 SEC
FEF2575-%PRED-POST: 145 %
FEF2575-%PRED-PRE: 108 %
FEF2575-POST: 3.01 L/SEC
FEF2575-PRE: 2.25 L/SEC
FEF2575-PRED: 2.06 L/SEC
FEFMAX-%PRED-PRE: 86 %
FEFMAX-PRE: 5.42 L/SEC
FEFMAX-PRED: 6.26 L/SEC
FEV1-%PRED-PRE: 96 %
FEV1-PRE: 2.23 L
FEV1FEV6-PRE: 83 %
FEV1FEV6-PRED: 80 %
FEV1FVC-PRE: 83 %
FEV1FVC-PRED: 80 %
FEV1SVC-PRE: 83 %
FEV1SVC-PRED: 70 %
FIFMAX-PRE: 3.58 L/SEC
FRCPLETH-%PRED-PRE: 75 %
FRCPLETH-PRE: 2.22 L
FRCPLETH-PRED: 2.96 L
FVC-%PRED-PRE: 92 %
FVC-PRE: 2.69 L
FVC-PRED: 2.92 L
IC-%PRED-PRE: 112 %
IC-PRE: 2.48 L
IC-PRED: 2.2 L
RVPLETH-%PRED-PRE: 100 %
RVPLETH-PRE: 2.01 L
RVPLETH-PRED: 2 L
TLCPLETH-%PRED-PRE: 92 %
TLCPLETH-PRE: 4.7 L
TLCPLETH-PRED: 5.11 L
VA-%PRED-PRE: 91 %
VA-PRE: 4.38 L
VC-%PRED-PRE: 81 %
VC-PRE: 2.7 L
VC-PRED: 3.3 L